# Patient Record
Sex: FEMALE | Race: WHITE | URBAN - METROPOLITAN AREA
[De-identification: names, ages, dates, MRNs, and addresses within clinical notes are randomized per-mention and may not be internally consistent; named-entity substitution may affect disease eponyms.]

---

## 2017-05-04 ENCOUNTER — ALLIED HEALTH/NURSE VISIT (OUTPATIENT)
Dept: DERMATOLOGY | Facility: CLINIC | Age: 32
End: 2017-05-04

## 2017-05-04 DIAGNOSIS — L81.3 CAFÉ AU LAIT SPOT: Primary | ICD-10-CM

## 2017-05-04 ASSESSMENT — PAIN SCALES - GENERAL: PAINLEVEL: NO PAIN (0)

## 2017-05-04 NOTE — MR AVS SNAPSHOT
After Visit Summary   2017    Natali Lassiter    MRN: [id number]           Patient Information     Date Of Birth          1985        Visit Information        Provider Department      2017 4:15 PM Nurse,  Dermatology [facility] Dermatology        Today's Diagnoses     Café au lait spot    -  1       Follow-ups after your visit        Who to contact     Please call your clinic at [phone] to:    Ask questions about your health    Make or cancel appointments    Discuss your medicines    Learn about your test results    Speak to your doctor   If you have compliments or concerns about an experience at your clinic, or if you wish to file a complaint, please contact [facility] Patient Relations at [phone] or email us at [email or web address]         Additional Information About Your Visit        MyChart Information     dotloop is an electronic gateway that provides easy, online access to your medical records. With dotloop, you can request a clinic appointment, read your test results, renew a prescription or communicate with your care team.     To sign up for Taaserat visit the website at [email or web address]   You will be asked to enter the access code listed below, as well as some personal information. Please follow the directions to create your username and password.     Your access code is: [email or web address]  Expires: 2017  4:14 PM     Your access code will  in 90 days. If you need help or a new code, please contact your [facility] or call [phone] for assistance.        Care EveryWhere ID     This is your Care EveryWhere ID. This could be used by other organizations to access your [place] medical records  [id number]         Blood Pressure from Last 3 Encounters:   No data found for BP    Weight from Last 3 Encounters:   No data found for Wt              Today, you had the following     No orders  found for display       Primary Care Provider    None Specified       No primary provider on file.        Thank you!     Thank you for choosing [facility] DERMATOLOGY  for your care. Our goal is always to provide you with excellent care. Hearing back from our patients is one way we can continue to improve our services. Please take a few minutes to complete the written survey that you may receive in the mail after your visit with us. Thank you!             Your Updated Medication List - Protect others around you: Learn how to safely use, store and throw away your medicines at www.disposemymeds.org.      Notice  As of 5/4/2017 11:59 PM    You have not been prescribed any medications.

## 2017-08-04 ENCOUNTER — OFFICE VISIT (OUTPATIENT)
Dept: DERMATOLOGY | Facility: CLINIC | Age: 32
End: 2017-08-04

## 2017-08-04 ENCOUNTER — ALLIED HEALTH/NURSE VISIT (OUTPATIENT)
Dept: DERMATOLOGY | Facility: CLINIC | Age: 32
End: 2017-08-04

## 2017-08-04 DIAGNOSIS — L98.8 RHYTIDES: ICD-10-CM

## 2017-08-04 DIAGNOSIS — R23.8 INTRINSIC AGING OF FACIAL SKIN: Primary | ICD-10-CM

## 2017-08-04 DIAGNOSIS — L98.8 WRINKLES: Primary | ICD-10-CM

## 2017-08-04 RX ORDER — VALACYCLOVIR HYDROCHLORIDE 1 G/1
2000 TABLET, FILM COATED ORAL
COMMUNITY
Start: 2017-03-30

## 2017-08-04 RX ORDER — ONDANSETRON 4 MG/1
4 TABLET, ORALLY DISINTEGRATING ORAL
COMMUNITY
Start: 2016-04-19

## 2017-08-04 ASSESSMENT — PAIN SCALES - GENERAL
PAINLEVEL: NO PAIN (0)
PAINLEVEL: NO PAIN (0)

## 2017-08-04 NOTE — LETTER
Date:August 7, 2017      Patient was self referred, no letter generated. Do not send.        [facility] Health Information

## 2017-08-04 NOTE — PROGRESS NOTES
Botox Injection Procedure Note    ATTENDING STAFF SURGEON: Dr. Yomi Carlson    RESIDENT SURGEON: Gucci Bahena, Bobby Jc    NURSE: Shanna Marshall    OPERATING ROOM DATA:   SURGERY/PROCEDURE DATE:   SAME     ANESTHESIA:   Topical LMX    PREOPERATIVE DIAGNOSIS:   Crow's feet, Furrowing and Rhytides    LOCATION: forehead, crow's feet, glabella    LOT NO: [id number], [id number]    EXP DATE: 5/2018, 6/2018    OPERATION/PROCEDURE:   Intralesional botulinum toxin injection     Dilution with 0.5cc preserved sterile normal saline in a 50 Unit Botox Vial.    Total units of botulinum toxin: 49    POSTOPERATIVE DIAGNOSIS:   SAME     PREPARATION:   Hibaclens    DESCRIPTION OF OPERATION/PROCEDURE:   The nature and purpose of the procedure, associated risks(including but not limited to muscle weakness, pain, headache, ptosis, anhidrosis), possible consequences and complications, and alternative methods of treatment were explained in detail. The patient declined a personal or family history of neuromuscular disease prior to the procedure.  An informed operative consent was obtained.    Cosmetic procedure: A total of 49 Units were injected into sites at the glabella, crow's feet, and forehead. The patient tolerated the procedure well and there were no complications noted. Patient was given wound care instructions and will follow-up as needed.     Clinical Follow-Up: PRN    The patient will not pay cosmetic fee today as procedure was done as resident training using sample product.      Staff Involved:  Resident/Staff      Soft Tissue Augmentation Procedure Note: Cosmetic    Procedure Date: 8/4/2017    Diagnosis: Facial rhytides and loss of central facial volume    Product: Juvaderm Ultra Xc    Attending Staff: Yomi Carlson MD    Resident: Marisa Terrazas    Assistant: Shanna Marshall    Locations: Cheeks, lower chin      Description of Operation/Procedure:     The nature and purpose of the procedure, associated risks,  possible consequences and complications, and alternative methods of treatment were explained in detail including but not limited to bruising, blindness, stroke, ulceration, ischemia, under correction, over correction, swelling, possible need for multiple treatments, infection, granuloma, pain, dyspigmentation, numbness, weakness or tingling were explained to the patient. Discussion of FDA on-label and off-label use was completed and disclosure for any sites treated off-label versus on-label was provided to patient. The patient verbalized understanding. Photo consent and signed informed consent were obtained.Time-out was performed and patient denied history of severe allergy to bees.  The facial areas were cleansed with hibiclens and injections were performed. A total of 0.5cc of filler was used. The patient tolerated the procedure well and there were no complications. Ice was provided post-procedure. The patient was provided after care instructions and will follow-up as needed.     The patient will not pay cosmetic fee today as procedure was done as resident training using sample product.      Staff:  Resident/Staff    ATTENDING ATTESTATION    I,Yomi Carlson, have seen, evaluated and discussed the patient with resident physician.  I have reviewed the resident physicians note and agree with their clinical findings, assessment and plan.  Any appropriate changes to the resident's note have been made.    I was present for the entire procedure.      Yomi Carlson MD, MS    Department of Dermatology  [facility]: Phone: [phone], Fax:[phone]  [facility]: Phone: [phone], Fax: [phone]

## 2017-08-04 NOTE — NURSING NOTE
Dermatology Rooming Note    Natali Lassiter's goals for this visit include:   Chief Complaint   Patient presents with     Botox     Doroteolizaveta comes to clinic today for botox and filler.     Radha Eddy, CMA

## 2017-08-04 NOTE — MR AVS SNAPSHOT
After Visit Summary   2017    Natali Lassiter    MRN: [id number]           Patient Information     Date Of Birth          1985        Visit Information        Provider Department      2017 2:30 PM Nurse,  Dermatology [facility] Dermatology        Today's Diagnoses     Wrinkles    -  1       Follow-ups after your visit        Who to contact     Please call your clinic at [phone] to:    Ask questions about your health    Make or cancel appointments    Discuss your medicines    Learn about your test results    Speak to your doctor   If you have compliments or concerns about an experience at your clinic, or if you wish to file a complaint, please contact [facility] Patient Relations at [phone] or email us at [email or web address]         Additional Information About Your Visit        MyChart Information     M-Factor is an electronic gateway that provides easy, online access to your medical records. With M-Factor, you can request a clinic appointment, read your test results, renew a prescription or communicate with your care team.     To sign up for DeepStream Technologiest visit the website at [email or web address]   You will be asked to enter the access code listed below, as well as some personal information. Please follow the directions to create your username and password.     Your access code is: [email or web address]  Expires: 2017  3:31 PM     Your access code will  in 90 days. If you need help or a new code, please contact your [facility] or call [phone] for assistance.        Care EveryWhere ID     This is your Care EveryWhere ID. This could be used by other organizations to access your [place] medical records  [id number]         Blood Pressure from Last 3 Encounters:   No data found for BP    Weight from Last 3 Encounters:   No data found for Wt              Today, you had the following     No orders found  for display       Primary Care Provider    None Specified       No primary provider on file.        Equal Access to Services     MEDARDO FORD : Hadii aad ku hadsharonkar Trinidad, willie morris, luis enriquebreanna alvarezmatila key. So [facility] [phone].    ATENCIÓN: Si habla español, tiene a wilkes disposición servicios gratuitos de asistencia lingüística. Llame al [phone].    We comply with applicable federal civil rights laws and [place] laws. We do not discriminate on the basis of race, color, national origin, age, disability sex, sexual orientation or gender identity.            Thank you!     Thank you for choosing [facility] DERMATOLOGY  for your care. Our goal is always to provide you with excellent care. Hearing back from our patients is one way we can continue to improve our services. Please take a few minutes to complete the written survey that you may receive in the mail after your visit with us. Thank you!             Your Updated Medication List - Protect others around you: Learn how to safely use, store and throw away your medicines at www.disposemymeds.org.          This list is accurate as of: 8/4/17  3:31 PM.  Always use your most recent med list.                   Brand Name Dispense Instructions for use Diagnosis    Norethin-Eth Estrad-Fe Biphas 1 MG-10 MCG / 10 MCG Tabs           ondansetron 4 MG ODT tab    ZOFRAN-ODT     Take 4 mg by mouth        valACYclovir 1000 mg tablet    VALTREX     Take 2,000 mg by mouth

## 2017-08-04 NOTE — PATIENT INSTRUCTIONS
Botulinum Toxin(Botox/Dysport) Cosmetic Information      I will have pain, redness, and swelling. I may have bruising, headache or discomfort at the site(s). Risks are asymmetry, numbness, twitching, brow droop, eyelid droop, headache, double vision, not enough effect or too much effect, difficulty whistling or drinking, loss of muscle tone, headache or infection. A touch-up or multiple treatments may be required.      About Botulinum Toxin (Botox/Dysport)  You have inquired about Botox cosmetic. Botulinum toxin is a purified protein derivative developed from bacteria. It has the ability to immobilize facial muscles that create dynamic wrinkles. Dynamic wrinkles develop due to muscle contraction, and over time become permanent folds in the skin, even when the muscles are not flexed. The use of Botox results in a very pleasing cosmetic effect for many people, leading to a more youthful, relaxed appearance. Botox can be used in combination with injectable fillers, chemical peels, and laser resurfacing to treat deeper wrinkles. It also has become an accepted form of treatment for hyperhidrosis, (or excessive sweating) in people who have not responded to other therapies.     With my treatment side effects may include bruising, headache or discomfort at the site(s). Asymmetry may occur and a touch-up may be required. Risks of this procedure include numbness, muscle twitching, brow or eyelid droop, headache, double vision, not enough effect or too much effect, difficulty whistling or drinking from a straw, loss of muscle tone, headache or infection      Post-Procedure Instructions:  Shower, facial cleansing, use of make-up and medicated creams is not restricted. Do not rub the treated area. Avoid exercise for the 24 hours following the procedure. Some people will experience bruising or eyelid ptosis (drooping) after injection. This is temporary and usually mild. Eyelid ptosis may be treated with special eye drops. Call  your doctor if you have any questions or concerns after your treatment.     Who should I call with questions?    [facility]: [phone]     [facility]: [phone]    For urgent needs outside of business hours call the [facility] at [phone] and ask for the resident on call

## 2017-08-04 NOTE — MR AVS SNAPSHOT
After Visit Summary   8/4/2017    Natali Lassiter    MRN: [id number]           Patient Information     Date Of Birth          1985        Visit Information        Provider Department      8/4/2017 2:45 PM Yomi Carlson MD [facility] Dermatology        Care Instructions    Botulinum Toxin(Botox/Dysport) Cosmetic Information      I will have pain, redness, and swelling. I may have bruising, headache or discomfort at the site(s). Risks are asymmetry, numbness, twitching, brow droop, eyelid droop, headache, double vision, not enough effect or too much effect, difficulty whistling or drinking, loss of muscle tone, headache or infection. A touch-up or multiple treatments may be required.      About Botulinum Toxin (Botox/Dysport)  You have inquired about Botox cosmetic. Botulinum toxin is a purified protein derivative developed from bacteria. It has the ability to immobilize facial muscles that create dynamic wrinkles. Dynamic wrinkles develop due to muscle contraction, and over time become permanent folds in the skin, even when the muscles are not flexed. The use of Botox results in a very pleasing cosmetic effect for many people, leading to a more youthful, relaxed appearance. Botox can be used in combination with injectable fillers, chemical peels, and laser resurfacing to treat deeper wrinkles. It also has become an accepted form of treatment for hyperhidrosis, (or excessive sweating) in people who have not responded to other therapies.     With my treatment side effects may include bruising, headache or discomfort at the site(s). Asymmetry may occur and a touch-up may be required. Risks of this procedure include numbness, muscle twitching, brow or eyelid droop, headache, double vision, not enough effect or too much effect, difficulty whistling or drinking from a straw, loss of muscle tone, headache or infection      Post-Procedure Instructions:  Shower, facial cleansing, use of make-up and  medicated creams is not restricted. Do not rub the treated area. Avoid exercise for the 24 hours following the procedure. Some people will experience bruising or eyelid ptosis (drooping) after injection. This is temporary and usually mild. Eyelid ptosis may be treated with special eye drops. Call your doctor if you have any questions or concerns after your treatment.     Who should I call with questions?    [facility]: [phone]     [facility]: [phone]    For urgent needs outside of business hours call the [facility] at [phone] and ask for the resident on call              Follow-ups after your visit        Who to contact     Please call your clinic at [phone] to:    Ask questions about your health    Make or cancel appointments    Discuss your medicines    Learn about your test results    Speak to your doctor   If you have compliments or concerns about an experience at your clinic, or if you wish to file a complaint, please contact [facility] Patient Relations at [phone] or email us at [email or web address]         Additional Information About Your Visit        virtual tweens ltdharStudyMax Information     PostBeyond is an electronic gateway that provides easy, online access to your medical records. With PostBeyond, you can request a clinic appointment, read your test results, renew a prescription or communicate with your care team.     To sign up for PostBeyond visit the website at [email or web address]   You will be asked to enter the access code listed below, as well as some personal information. Please follow the directions to create your username and password.     Your access code is: [email or web address]  Expires: 2017  3:31 PM     Your access code will  in 90 days. If you need help or a new code, please contact your [facility] or call [phone] for assistance.         Care EveryWhere ID     This is your Care EveryWhere ID. This could be used by other organizations to access your [place] medical records  [id number]         Blood Pressure from Last 3 Encounters:   No data found for BP    Weight from Last 3 Encounters:   No data found for Wt              Today, you had the following     No orders found for display       Primary Care Provider    None Specified       No primary provider on file.        Equal Access to Services     [facility]: Hadii aad ku hadasho Soomaali, waaxda luqadaha, qaybta kaalmada adeegyada, tila deweyin haycrissn adeeg maxim lasusancydney . So [facility] [phone].    ATENCIÓN: Si habla español, tiene a wilkes disposición servicios gratuitos de asistencia lingüística. Jennyame al [phone].    We comply with applicable federal civil rights laws and [place] laws. We do not discriminate on the basis of race, color, national origin, age, disability sex, sexual orientation or gender identity.            Thank you!     Thank you for choosing [facility] DERMATOLOGY  for your care. Our goal is always to provide you with excellent care. Hearing back from our patients is one way we can continue to improve our services. Please take a few minutes to complete the written survey that you may receive in the mail after your visit with us. Thank you!             Your Updated Medication List - Protect others around you: Learn how to safely use, store and throw away your medicines at www.disposemymeds.org.          This list is accurate as of: 8/4/17  6:14 PM.  Always use your most recent med list.                   Brand Name Dispense Instructions for use Diagnosis    Norethin-Eth Estrad-Fe Biphas 1 MG-10 MCG / 10 MCG Tabs           ondansetron 4 MG ODT tab    ZOFRAN-ODT     Take 4 mg by mouth        valACYclovir 1000 mg tablet    VALTREX     Take 2,000 mg by mouth

## 2017-08-04 NOTE — LETTER
8/4/2017       RE: Natali Lassiter  [address]  [place] [address]     Dear Colleague,    Thank you for referring your patient, Natali Lassiter, to the [facility] DERMATOLOGY at [facility]. Please see a copy of my visit note below.    Botox Injection Procedure Note    ATTENDING STAFF SURGEON: Dr. Yomi Carlson    RESIDENT SURGEON: Gucci Bahena, Bobby Jc    NURSE: Shanna Marshall    OPERATING ROOM DATA:   SURGERY/PROCEDURE DATE:   SAME     ANESTHESIA:   Topical LMX    PREOPERATIVE DIAGNOSIS:   Crow's feet, Furrowing and Rhytides    LOCATION: forehead, crow's feet, glabella    LOT NO: [id number], [id number]    EXP DATE: 5/2018, 6/2018    OPERATION/PROCEDURE:   Intralesional botulinum toxin injection     Dilution with 0.5cc preserved sterile normal saline in a 50 Unit Botox Vial.    Total units of botulinum toxin: 49    POSTOPERATIVE DIAGNOSIS:   SAME     PREPARATION:   Hibaclens    DESCRIPTION OF OPERATION/PROCEDURE:   The nature and purpose of the procedure, associated risks(including but not limited to muscle weakness, pain, headache, ptosis, anhidrosis), possible consequences and complications, and alternative methods of treatment were explained in detail. The patient declined a personal or family history of neuromuscular disease prior to the procedure.  An informed operative consent was obtained.    Cosmetic procedure: A total of 49 Units were injected into sites at the glabella, crow's feet, and forehead. The patient tolerated the procedure well and there were no complications noted. Patient was given wound care instructions and will follow-up as needed.     Clinical Follow-Up: PRN    The patient will not pay cosmetic fee today as procedure was done as resident training using sample product.      Staff Involved:  Resident/Staff      Soft Tissue Augmentation Procedure Note: Cosmetic    Procedure Date: 8/4/2017    Diagnosis: Facial rhytides and loss of  central facial volume    Product: Juvaderm Ultra Xc    Attending Staff: Yomi Carlson MD    Resident: Marisa Terrazas    Assistant: Shanna Marshall    Locations: Cheeks, lower chin      Description of Operation/Procedure:     The nature and purpose of the procedure, associated risks, possible consequences and complications, and alternative methods of treatment were explained in detail including but not limited to bruising, blindness, stroke, ulceration, ischemia, under correction, over correction, swelling, possible need for multiple treatments, infection, granuloma, pain, dyspigmentation, numbness, weakness or tingling were explained to the patient. Discussion of FDA on-label and off-label use was completed and disclosure for any sites treated off-label versus on-label was provided to patient. The patient verbalized understanding. Photo consent and signed informed consent were obtained.Time-out was performed and patient denied history of severe allergy to bees.  The facial areas were cleansed with hibiclens and injections were performed. A total of 0.5cc of filler was used. The patient tolerated the procedure well and there were no complications. Ice was provided post-procedure. The patient was provided after care instructions and will follow-up as needed.     The patient will not pay cosmetic fee today as procedure was done as resident training using sample product.      Staff:  Resident/Staff    ATTENDING ATTESTATION    I,Yomi Carlson, have seen, evaluated and discussed the patient with resident physician.  I have reviewed the resident physicians note and agree with their clinical findings, assessment and plan.  Any appropriate changes to the resident's note have been made.    I was present for the entire procedure.      Yomi Carlson MD, MS    Department of Dermatology  [facility]: Phone: [phone], Fax:[phone]  [facility]  [facility]: Phone: [phone], Fax: [phone]              Again, thank you for allowing me to participate in the care of your patient.      Sincerely,    Yomi Carlson MD

## 2018-03-19 ENCOUNTER — ALLIED HEALTH/NURSE VISIT (OUTPATIENT)
Dept: NURSING | Facility: CLINIC | Age: 33
End: 2018-03-19
Payer: COMMERCIAL

## 2018-03-19 DIAGNOSIS — L57.8 PHOTOAGING OF SKIN: Primary | ICD-10-CM

## 2018-03-19 PROCEDURE — 96999 UNLISTED SPEC DERM SVC/PX: CPT

## 2018-03-19 NOTE — NURSING NOTE
Nursing Peel Treatment Record:  Mar 19, 2018    Pre peel:    Any changes in health or medications: No    Strawberry or aspirin allergy(If yes, then no salicylic acid peels to be given and procedure to be held): No    Sulfa allergy(If yes, then SkinCeuticals sensitive skin peel procedure to be held): No    Pregnant or breastfeeding(If yes, peel procedure to be held): No    Baseline photo obtained(3 views): No    Areas treated today: face    Treatment #: 1.    Confirmed that retinoid was stopped 7 days prior to peel: Yes    Peel Type: Micropeel 20%    Confirmed that patient has not had electrolysis, depilatory creams, waxing or laser hair removal in the last week: Yes    Peel record:    Eye shields were placed.     Area to be treated was cleansed with Simply Clean Cleanser using rough 4X4 gauze pads.    Face was dermaplaned    Area was then toned with with the Conditioning Solution using rough 4X4 gauze pads.    Then, the areas were degreased with acetone. Time-out was performed to evaluate for any sensitive skin.      Hydrabalm was then applied to the lips, perinasal region and near the outer canthi.     The peel was applied with 2x2 soft gauze for 1 pass and neutralized using Peel Neutralized, then removed with water dampened 4X4 soft gauze.     Phytocorrective gel and epidermal repair topicals were applied and followed by Sunscreen (Sheer Physical UV defence SPF 50).     Additional treatment notes: mild erythema present on cheeks.    Post peel:    Patient reminded of need to wait to use medicated creams until the skin has healed. This occurs five to seven days later. Post peel care instructions provided including need for sun avoidance. Patient tolerated peel well, no complications noted.       Payment:  Training - no charge.    Margoth Erickson RN

## 2018-03-19 NOTE — MR AVS SNAPSHOT
After Visit Summary   3/19/2018    Natali Lassiter    MRN: [id number]           Patient Information     Date Of Birth          1985        Visit Information        Provider Department      3/19/2018 10:00 AM NURSE ONLY [facility] DERM [facility]        Today's Diagnoses     Photoaging of skin    -  1       Follow-ups after your visit        Who to contact     If you have questions or need follow up information about today's clinic visit or your schedule please contact [facility] directly at [phone].  Normal or non-critical lab and imaging results will be communicated to you by [employer] Stre[employer], letter or phone within 4 business days after the clinic has received the results. If you do not hear from us within 7 days, please contact the clinic through [employer] or phone. If you have a critical or abnormal lab result, we will notify you by phone as soon as possible.  Submit refill requests through AdStage or call your pharmacy and they will forward the refill request to us. Please allow 3 business days for your refill to be completed.          Additional Information About Your Visit        MyChart Information     [employer]Stage is an electronic gateway that provides easy, online access to your medical records. With AdStage, you can request a clinic appointment, read your test results, renew a prescription or communicate with your care team.     To sign up for AdStage visit the website at [email or web address]   You will be asked to enter the access code listed below, as well as some personal information. Please follow the directions to create your username and password.     Your access code is: [email or web address]  Expires: 2018 12:25 PM     Your access code will  in 90 days. If you need help or a new code, please contact your [facility] or call [phone] for assistance.        Care EveryWhere ID     This is your Care EveryWhere ID. This  could be used by other organizations to access your [place] medical records  [id number]         Blood Pressure from Last 3 Encounters:   No data found for BP    Weight from Last 3 Encounters:   No data found for Wt              We Performed the Following     MG C Micropeel Procedure        Primary Care Provider Office Phone # Fax #    Abbi Tellez [phone]       [facility] CARE   [address] [address]        Equal Access to Services     MEDARDO FORD : Hadii aad ku hadasho Soomaali, waaxda luqadaha, qaybta kaalmada adeegyada, waxay idiin hayaan adeeg kharash la'aan ah. So [facility] [phone].    ATENCIÓN: Si habla español, tiene a wilkes disposición servicios gratuitos de asistencia lingüística. [facility] [phone].    We comply with applicable federal civil rights laws and [place] laws. We do not discriminate on the basis of race, color, national origin, age, disability, sex, sexual orientation, or gender identity.            Thank you!     Thank you for choosing [facility]  for your care. Our goal is always to provide you with excellent care. Hearing back from our patients is one way we can continue to improve our services. Please take a few minutes to complete the written survey that you may receive in the mail after your visit with us. Thank you!             Your Updated Medication List - Protect others around you: Learn how to safely use, store and throw away your medicines at www.disposemymeds.org.          This list is accurate as of 3/19/18 12:25 PM.  Always use your most recent med list.                   Brand Name Dispense Instructions for use Diagnosis    Norethin-Eth Estrad-Fe Biphas 1 MG-10 MCG / 10 MCG Tabs           ondansetron 4 MG ODT tab    ZOFRAN-ODT     Take 4 mg by mouth        valACYclovir 1000 mg tablet    VALTREX     Take 2,000 mg by mouth

## 2018-05-21 ENCOUNTER — ALLIED HEALTH/NURSE VISIT (OUTPATIENT)
Dept: NURSING | Facility: CLINIC | Age: 33
End: 2018-05-21
Payer: COMMERCIAL

## 2018-05-21 DIAGNOSIS — L57.8 PHOTOAGING OF SKIN: Primary | ICD-10-CM

## 2018-05-21 PROCEDURE — 96999 UNLISTED SPEC DERM SVC/PX: CPT

## 2018-05-21 NOTE — PATIENT INSTRUCTIONS
Skin Peel Post Care Instructions    I will experience redness, swelling, peeling, pain, and heat sensation which can last 5-10days and may persist for 2-3weeks. I may experience itching, or acne. Risks are permanent scarring, temporary or permanent skin lightening or darkening, infection, and eye injury. I understand my outcome could be no improvement or slight improvement. Multiple treatments may be required.     What can I expect?    Skin peeling for three to five days    Flaking    Skin darkening    Redness    How do I take care of my skin?    Patient compliance is mandatory for an optimal outcome and to avoid complications    Wear sunscreen (SPF 30 or greater) and a hat. Stay out of the sun for three to four weeks     Use a gentle skin care regimen with a sunscreen with at least an SPF of 30 or more. Examples include the following:   o SkinCeuticals Gentle Cleanser and SkinCeuticals Physical Matte UV DEFENSE SPF 50   o Cetaphil Soap followed by Cetaphil Sunscreen in the am and pm    Put on a bland moisturizer (Aquaphor or Vaseline) if sunscreen stings    Do not pick at peel or peel the skin. This will cause scarring.    Wait to use medicated creams until the skin has healed. This occurs five to seven days later    You can use make-up after 24 hours. Make sure make-up does NOT contain salicylic acid.     Eye make-up and lipstick are okay immediately after procedure    Do not use facial scrubs, depilatories, steam, any exfoliation procedures, etc. on your face (or treated area of skin) for one week post-peel    When should I call the doctor?    Cold sores    Swelling    Crusting    Who should I call with questions?    [facility]: [phone]     [facility]: [phone]    For urgent needs outside of business hours call the [facility] at [phone] and ask for the dermatology resident on call

## 2018-05-21 NOTE — NURSING NOTE
Nursing Peel Treatment Record:  May 21, 2018    Pre peel:    Any changes in health or medications: No    Strawberry or aspirin allergy(If yes, then no salicylic acid peels to be given and procedure to be held): No    Sulfa allergy(If yes, then SkinCeuticals sensitive skin peel procedure to be held): No    Pregnant or breastfeeding(If yes, peel procedure to be held): No    Baseline photo obtained(3 views): N/A    Areas treated today: face    Treatment #: 2.    Confirmed that retinoid was stopped 7 days prior to peel: No    Peel Type: Micropeel plus(20% salicylic acid with 3% glycolic acid)    Confirmed that patient has not had electrolysis, depilatory creams, waxing or laser hair removal in the last week: confirmed    Peel record:    Eye shields were placed.     Area to be treated was cleansed with Simply Clean Cleanser using rough 4X4 gauze pads.    Area was then toned with with the Equalizing Solution using rough 4X4 gauze pads.    Then, the areas were degreased with acetone. Time-out was performed to evaluate for any sensitive skin.      Hydrabalm was then applied to the lips, perinasal region and near the outer canthi.     The peel was applied with prieto swabs for 2 passes, then removed with water dampened 4X4 soft gauze.     CE Ferulic, Phytocorrective gel and epidermal repair topicals were applied and followed by Sunscreen (Sheer Physical UV defence SPF 50).     Additional treatment notes: patient had uniform frosting.      Post peel:    Patient reminded of need to wait to use medicated creams until the skin has healed. This occurs five to seven days later. Post peel care instructions provided including need for sun avoidance. Patient tolerated peel well, no complications noted.       Payment:  Peel training - no charge.

## 2018-07-18 ENCOUNTER — OFFICE VISIT (OUTPATIENT)
Dept: DERMATOLOGY | Facility: CLINIC | Age: 33
End: 2018-07-18
Payer: COMMERCIAL

## 2018-07-18 DIAGNOSIS — L98.8 RHYTIDES: Primary | ICD-10-CM

## 2018-07-18 ASSESSMENT — PAIN SCALES - GENERAL: PAINLEVEL: NO PAIN (0)

## 2018-07-18 NOTE — MR AVS SNAPSHOT
After Visit Summary   7/18/2018    Natali Lassiter    MRN: [id number]           Patient Information     Date Of Birth          1985        Visit Information        Provider Department      7/18/2018 11:45 AM Carol Leblanc MD [facility] Dermatology        Today's Diagnoses     Rhytides    -  1      Care Instructions    Botulinum Toxin(Botox/Dysport) Cosmetic Information      I will have pain, redness, and swelling. I may have bruising, headache or discomfort at the site(s). Risks are asymmetry, numbness, twitching, brow droop, eyelid droop, headache, double vision, not enough effect or too much effect, difficulty whistling or drinking, loss of muscle tone, headache or infection. A touch-up or multiple treatments may be required.      About Botulinum Toxin (Botox/Dysport)  You have inquired about Botox cosmetic. Botulinum toxin is a purified protein derivative developed from bacteria. It has the ability to immobilize facial muscles that create dynamic wrinkles. Dynamic wrinkles develop due to muscle contraction, and over time become permanent folds in the skin, even when the muscles are not flexed. The use of Botox results in a very pleasing cosmetic effect for many people, leading to a more youthful, relaxed appearance. Botox can be used in combination with injectable fillers, chemical peels, and laser resurfacing to treat deeper wrinkles. It also has become an accepted form of treatment for hyperhidrosis, (or excessive sweating) in people who have not responded to other therapies.     With my treatment side effects may include bruising, headache or discomfort at the site(s). Asymmetry may occur and a touch-up may be required. Risks of this procedure include numbness, muscle twitching, brow or eyelid droop, headache, double vision, not enough effect or too much effect, difficulty whistling or drinking from a straw, loss of muscle tone, headache or infection      Post-Procedure  Instructions:  Shower, facial cleansing, use of make-up and medicated creams is not restricted. Do not rub the treated area. Avoid exercise for the 24 hours following the procedure. Some people will experience bruising or eyelid ptosis (drooping) after injection. This is temporary and usually mild. Eyelid ptosis may be treated with special eye drops. Call your doctor if you have any questions or concerns after your treatment.     Who should I call with questions?    [facility]: [phone]     [facility]: [phone]    For urgent needs outside of business hours call the [facility] at [phone] and ask for the resident on call                  Follow-ups after your visit        Who to contact     Please call your clinic at [phone] to:    Ask questions about your health    Make or cancel appointments    Discuss your medicines    Learn about your test results    Speak to your doctor            Additional Information About Your Visit        MyCharVotigo Information     [employer] is an electronic gateway that provides easy, online access to your medical records. With [employer], you can request a clinic appointment, read your test results, renew a prescription or communicate with your care team.     To sign up for [employer] Software visit the website at [email or web address]   You will be asked to enter the access code listed below, as well as some personal information. Please follow the directions to create your username and password.     Your access code is: [email or web address]  Expires: 10/30/2018 10:32 PM     Your access code will  in 90 days. If you need help or a new code, please contact your [facility] or call [phone] for assistance.        Care EveryWhere ID     This is your Care EveryWhere ID. This could be used by other organizations to access your [place] medical records  [id number]         Blood Pressure  from Last 3 Encounters:   No data found for BP    Weight from Last 3 Encounters:   No data found for Wt              Today, you had the following     No orders found for display       Primary Care Provider Office Phone # Fax #    Abbi Tellez [phone]       [facility] C[facility]   [address] [address]        Equal Access to Services     [facility] : Hadii aad ku hadasho Soomaali, waaxda luqadaha, qaybta kaalmada adeegyada, waxay idiin hayaan adeeg kharash la'aan . So [facility] [phone].    ATENCIÓN: Si habla español, tiene a wilkes disposición servicios gratuitos de asistencia lingüística. Llame al [phone].    We comply with applicable federal civil rights laws and [place] laws. We do not discriminate on the basis of race, color, national origin, age, disability, sex, sexual orientation, or gender identity.            Thank you!     Thank you for choosing [facility] DERMATOLOGY  for your care. Our goal is always to provide you with excellent care. Hearing back from our patients is one way we can continue to improve our services. Please take a few minutes to complete the written survey that you may receive in the mail after your visit with us. Thank you!             Your Updated Medication List - Protect others around you: Learn how to safely use, store and throw away your medicines at www.disposemymeds.org.          This list is accurate as of 7/18/18 11:59 PM.  Always use your most recent med list.                   Brand Name Dispense Instructions for use Diagnosis    Norethin-Eth Estrad-Fe Biphas 1 MG-10 MCG / 10 MCG Tabs           ondansetron 4 MG ODT tab    ZOFRAN-ODT     Take 4 mg by mouth        valACYclovir 1000 mg tablet    VALTREX     Take 2,000 mg by mouth

## 2018-07-18 NOTE — PROCEDURES
Botulinum Injection Procedure Note:  Cosmetic    ATTENDING STAFF SURGEON: Dr. Carol Leblanc MD    RESIDENT SURGEON: Lakhwinder Doyle MD     NURSE: Yennifer Cerda LPN    ANESTHESIA:   None    PREOPERATIVE DIAGNOSIS:   Crow's feet and Rhytides    LOCATION: Forehead, glabella, crows feet and DAOs (chin).       LOT NO: [id number]    EXP DATE: 04/2020    OPERATION/PROCEDURE:   Intralesional botulinum toxin injection     Dilution with 0.5 cc preserved sterile normal saline in 2 50 Unit Botox Vials.    Total units of botulinum toxin: 60    POSTOPERATIVE DIAGNOSIS:   SAME     PREPARATION:   Alcohol swab    DESCRIPTION OF OPERATION/PROCEDURE:   The nature and purpose of the procedure, associated risks including but not limited to bruising, headache or discomfort at the site(s), numbness, muscle twitching, brow or eyelid droop, headache, double vision, not enough effect or too much effect, difficulty whistling or drinking from a straw, loss of muscle tone, or infection. Possible consequences and complications, and alternative methods of treatment were explained in detail. The patient declined a personal or family history of neuromuscular disease prior to the procedure. The patient is not pregnant or breast feeding.A signed informed operative consent was obtained.    The patient was taken to the operative suite and properly positioned. The area to be treated was defined and confirmed by the patient and physician. The area for Botox injection was marked.    Cosmetic procedure: A total of 60 Units were injected into sites at the forehead, glabella, crows feet and DAOs (chin).  The patient tolerated the procedure well and there were no complications noted. Patient was given wound care instructions and will follow-up in approximately PRN.     Dr. Carol Leblanc MD was immediately available for the entire surgery and was physicially present for the key portions of the procedure.    No charge for procedure; resident donated sample for  learning purposes.     Clinical Follow-Up: PRN     Staff Involved:  Resident(Lakhwinder Doyle name)/Staff(as above)    Lakhwinder Doyle MD  PGY4 Dermatology  [phone]     Staff Physician Comments:   I saw and evaluated the patient with the resident and I agree with the assessment and plan.  I was present for the key portions of the above major procedure and examination..    Craol Leblanc MD    Department of Dermatology  [facility]: Phone: [phone], Fax:[phone]  [facility]: Phone: [phone], Fax: [phone]

## 2018-07-18 NOTE — NURSING NOTE
Dermatology Rooming Note    Natali Lassiter's goals for this visit include:   Chief Complaint   Patient presents with     Botox     María is visiting for resident trainning r/t botox [face]     Yennifer Persaud LPN

## 2018-07-18 NOTE — PATIENT INSTRUCTIONS
Botulinum Toxin(Botox/Dysport) Cosmetic Information      I will have pain, redness, and swelling. I may have bruising, headache or discomfort at the site(s). Risks are asymmetry, numbness, twitching, brow droop, eyelid droop, headache, double vision, not enough effect or too much effect, difficulty whistling or drinking, loss of muscle tone, headache or infection. A touch-up or multiple treatments may be required.      About Botulinum Toxin (Botox/Dysport)  You have inquired about Botox cosmetic. Botulinum toxin is a purified protein derivative developed from bacteria. It has the ability to immobilize facial muscles that create dynamic wrinkles. Dynamic wrinkles develop due to muscle contraction, and over time become permanent folds in the skin, even when the muscles are not flexed. The use of Botox results in a very pleasing cosmetic effect for many people, leading to a more youthful, relaxed appearance. Botox can be used in combination with injectable fillers, chemical peels, and laser resurfacing to treat deeper wrinkles. It also has become an accepted form of treatment for hyperhidrosis, (or excessive sweating) in people who have not responded to other therapies.     With my treatment side effects may include bruising, headache or discomfort at the site(s). Asymmetry may occur and a touch-up may be required. Risks of this procedure include numbness, muscle twitching, brow or eyelid droop, headache, double vision, not enough effect or too much effect, difficulty whistling or drinking from a straw, loss of muscle tone, headache or infection      Post-Procedure Instructions:  Shower, facial cleansing, use of make-up and medicated creams is not restricted. Do not rub the treated area. Avoid exercise for the 24 hours following the procedure. Some people will experience bruising or eyelid ptosis (drooping) after injection. This is temporary and usually mild. Eyelid ptosis may be treated with special eye drops. Call  your doctor if you have any questions or concerns after your treatment.     Who should I call with questions?    [facility]: [phone]     [facility]: [phone]    For urgent needs outside of business hours call the [facility] at [phone] and ask for the resident on call

## 2018-07-18 NOTE — LETTER
7/18/2018       RE: Natali Lassiter  [address]  [place] [address]     Dear Colleague,    Thank you for referring your patient, Natali Lassiter, to the [facility]. Please see a copy of my visit note below.    [facility] Dermatology Note      Dermatology Problem List:  1.Medlite for linear cafe au lait  2. Botulinum toxin    Encounter Date: Jul 18, 2018    CC:   Chief Complaint   Patient presents with     Botox     María is visiting for resident trainning r/t botox [face]         History of Present Illness:  Ms. Natali Lassiter is a 33 year old female who follow up for botox. Wants forehead. Maybe interested in repeat medlite    Past Medical History:   There is no problem list on file for this patient.    History reviewed. No pertinent past medical history.  History reviewed. No pertinent surgical history.    Social History:  The patient works as a nurse.      Family History:  Not obtained    Medications:  Current Outpatient Prescriptions   Medication Sig Dispense Refill     Norethin-Eth Estrad-Fe Biphas 1 MG-10 MCG / 10 MCG TABS        ondansetron (ZOFRAN-ODT) 4 MG ODT tab Take 4 mg by mouth       valACYclovir (VALTREX) 1000 mg tablet Take 2,000 mg by mouth          Allergies   Allergen Reactions     Penicillins Anaphylaxis         Review of Systems:  -Not pertinent to the current visit.     Physical exam:  Vitals: There were no vitals taken for this visit.  GEN: This is a well developed, well-nourished female in no acute distress, in a pleasant mood.    SKIN: Focused examination of the face was performed.  -rhytides- forehead   -No other lesions of concern on areas examined.     Impression/Plan:  1. Rhytides-see botox procedure note  see procedure tab    2. Linear CALM; will consider additional Medlight going forward.      Dr. Carol Leblanc MD staffed the patient.    Staff Involved:  Resident(Lakhwinder Doyle  PGY-4)/Staff(as above)    Staff Physician Comments:   I saw and evaluated the patient with the resident and I agree with the assessment and plan.  I was present for the examination and key portions of procedure    Resident sample product used.    Carol Leblanc MD    Department of Dermatology  [facility]: Phone: [phone], Fax:[phone]  [facility]: Phone: [phone], Fax: [phone]

## 2018-07-18 NOTE — PROGRESS NOTES
[facility] Dermatology Note      Dermatology Problem List:  1.Medlite for linear cafe au lait  2. Botulinum toxin    Encounter Date: Jul 18, 2018    CC:   Chief Complaint   Patient presents with     Botox     María is visiting for resident trainning r/t botox [face]         History of Present Illness:  Ms. Natali Lassiter is a 33 year old female who follow up for botox. Wants forehead. Maybe interested in repeat medlite    Past Medical History:   There is no problem list on file for this patient.    History reviewed. No pertinent past medical history.  History reviewed. No pertinent surgical history.    Social History:  The patient works as a nurse.      Family History:  Not obtained    Medications:  Current Outpatient Prescriptions   Medication Sig Dispense Refill     Norethin-Eth Estrad-Fe Biphas 1 MG-10 MCG / 10 MCG TABS        ondansetron (ZOFRAN-ODT) 4 MG ODT tab Take 4 mg by mouth       valACYclovir (VALTREX) 1000 mg tablet Take 2,000 mg by mouth          Allergies   Allergen Reactions     Penicillins Anaphylaxis         Review of Systems:  -Not pertinent to the current visit.     Physical exam:  Vitals: There were no vitals taken for this visit.  GEN: This is a well developed, well-nourished female in no acute distress, in a pleasant mood.    SKIN: Focused examination of the face was performed.  -rhytides- forehead   -No other lesions of concern on areas examined.     Impression/Plan:  1. Rhytides-see botox procedure note  see procedure tab    2. Linear CALM; will consider additional Medlight going forward.      Dr. Carol Leblanc MD staffed the patient.    Staff Involved:  Resident(Lakhwinder Doyle PGY-4)/Staff(as above)    Staff Physician Comments:   I saw and evaluated the patient with the resident and I agree with the assessment and plan.  I was present for the examination and key portions of procedure    Resident sample product used.    Carol Leblanc MD  Assistant  Professor  Department of Dermatology  [facility]: Phone: [phone], Fax:[phone]  [facility]: Phone: [phone], Fax: [phone]

## 2020-11-16 NOTE — MR AVS SNAPSHOT
After Visit Summary   5/21/2018    Natali Lassiter    MRN: [id number]           Patient Information     Date Of Birth          1985        Visit Information        Provider Department      5/21/2018 3:00 PM NURSE ONLY [facility]        Today's Diagnoses     Photoaging of skin    -  1      Care Instructions    Skin Peel Post Care Instructions    I will experience redness, swelling, peeling, pain, and heat sensation which can last 5-10days and may persist for 2-3weeks. I may experience itching, or acne. Risks are permanent scarring, temporary or permanent skin lightening or darkening, infection, and eye injury. I understand my outcome could be no improvement or slight improvement. Multiple treatments may be required.     What can I expect?    Skin peeling for three to five days    Flaking    Skin darkening    Redness    How do I take care of my skin?    Patient compliance is mandatory for an optimal outcome and to avoid complications    Wear sunscreen (SPF 30 or greater) and a hat. Stay out of the sun for three to four weeks     Use a gentle skin care regimen with a sunscreen with at least an SPF of 30 or more. Examples include the following:   o SkinCeuticals Gentle Cleanser and SkinCeuticals Physical Matte UV DEFENSE SPF 50   o Cetaphil Soap followed by Cetaphil Sunscreen in the am and pm    Put on a bland moisturizer (Aquaphor or Vaseline) if sunscreen stings    Do not pick at peel or peel the skin. This will cause scarring.    Wait to use medicated creams until the skin has healed. This occurs five to seven days later    You can use make-up after 24 hours. Make sure make-up does NOT contain salicylic acid.     Eye make-up and lipstick are okay immediately after procedure    Do not use facial scrubs, depilatories, steam, any exfoliation procedures, etc. on your face (or treated area of skin) for one week post-peel    When should I call the doctor?    Cold  sores    Swelling    Crusting    Who should I call with questions?    [facility]: [phone]     [facility]: [phone]    For urgent needs outside of business hours call the [facility] at [phone] and ask for the dermatology resident on call              Follow-ups after your visit        Who to contact     If you have questions or need follow up information about today's clinic visit or your schedule please contact [facility] directly at [phone].  Normal or non-critical lab and imaging results will be communicated to you by [employer], letter or phone within 4 business days after the clinic has received the results. If you do not hear from us within 7 days, please contact the clinic through [employer] or phone. If you have a critical or abnormal lab result, we will notify you by phone as soon as possible.  Submit refill requests through [employer] or call your pharmacy and they will forward the refill request to us. Please allow 3 business days for your refill to be completed.          Additional Information About Your Visit        [employer] Information     [employer] is an electronic gateway that provides easy, online access to your medical records. With [employer], you can request a clinic appointment, read your test results, renew a prescription or communicate with your care team.     To sign up for [employer] visit the website at [email or web address]   You will be asked to enter the access code listed below, as well as some personal information. Please follow the directions to create your username and password.     Your access code is: [email or web address]  Expires: 2018 12:25 PM     Your access code will  in 90 days. If you need help or a new code, please contact your [facility] or call [phone] for assistance.        Care EveryWhere ID     This is your Care EveryWhere ID. This could  be used by other organizations to access your [place] medical records  [id number]         Blood Pressure from Last 3 Encounters:   No data found for BP    Weight from Last 3 Encounters:   No data found for Wt              We Performed the Following     C MICROPEEL PLUS PROCEDURE        Primary Care Provider Office Phone # Fax #    Abbi Tellez [phone]       Count includes the [facility] CARE   [address] [address]        Equal Access to Services     MEDARDO FORD : Hadii aad ku hadasho Soomaali, waaxda luqadaha, qaybta kaalmada adeegyada, waxay idiin hayaan adeeg kharash la'aan ah. So [facility] [phone].    ATENCIÓN: Si habla español, tiene a wilkes disposición servicios gratuitos de asistencia lingüística. Ll[facility] [phone].    We comply with applicable federal civil rights laws and [place] laws. We do not discriminate on the basis of race, color, national origin, age, disability, sex, sexual orientation, or gender identity.            Thank you!     Thank you for choosing [facility]  for your care. Our goal is always to provide you with excellent care. Hearing back from our patients is one way we can continue to improve our services. Please take a few minutes to complete the written survey that you may receive in the mail after your visit with us. Thank you!             Your Updated Medication List - Protect others around you: Learn how to safely use, store and throw away your medicines at www.disposemymeds.org.          This list is accurate as of 5/21/18  3:37 PM.  Always use your most recent med list.                   Brand Name Dispense Instructions for use Diagnosis    Norethin-Eth Estrad-Fe Biphas 1 MG-10 MCG / 10 MCG Tabs           ondansetron 4 MG ODT tab    ZOFRAN-ODT     Take 4 mg by mouth        valACYclovir 1000 mg tablet    VALTREX     Take 2,000 mg by mouth           PAST MEDICAL HISTORY:  Pain, joint, lower leg, right since 12/12/2019     PAST MEDICAL HISTORY:  H/O clavicle fracture left, managed non surgical/ sling    Non-traumatic compartment syndrome of lower limb right  leg pain    Pain, joint, lower leg, right since 12/12/2019